# Patient Record
Sex: MALE | Race: WHITE | NOT HISPANIC OR LATINO | URBAN - METROPOLITAN AREA
[De-identification: names, ages, dates, MRNs, and addresses within clinical notes are randomized per-mention and may not be internally consistent; named-entity substitution may affect disease eponyms.]

---

## 2023-04-18 ENCOUNTER — LAB VISIT (OUTPATIENT)
Dept: LAB | Facility: HOSPITAL | Age: 23
End: 2023-04-18
Attending: FAMILY MEDICINE
Payer: COMMERCIAL

## 2023-04-18 ENCOUNTER — OFFICE VISIT (OUTPATIENT)
Dept: FAMILY MEDICINE | Facility: CLINIC | Age: 23
End: 2023-04-18
Payer: COMMERCIAL

## 2023-04-18 VITALS
HEIGHT: 70 IN | WEIGHT: 216.69 LBS | DIASTOLIC BLOOD PRESSURE: 60 MMHG | RESPIRATION RATE: 18 BRPM | SYSTOLIC BLOOD PRESSURE: 118 MMHG | TEMPERATURE: 98 F | OXYGEN SATURATION: 98 % | HEART RATE: 67 BPM | BODY MASS INDEX: 31.02 KG/M2

## 2023-04-18 DIAGNOSIS — Z13.220 ENCOUNTER FOR LIPID SCREENING FOR CARDIOVASCULAR DISEASE: ICD-10-CM

## 2023-04-18 DIAGNOSIS — E66.09 CLASS 1 OBESITY DUE TO EXCESS CALORIES WITH SERIOUS COMORBIDITY AND BODY MASS INDEX (BMI) OF 30.0 TO 30.9 IN ADULT: ICD-10-CM

## 2023-04-18 DIAGNOSIS — E66.9 OBESITY, UNSPECIFIED CLASSIFICATION, UNSPECIFIED OBESITY TYPE, UNSPECIFIED WHETHER SERIOUS COMORBIDITY PRESENT: ICD-10-CM

## 2023-04-18 DIAGNOSIS — Z01.00 ENCOUNTER FOR EYE EXAM: ICD-10-CM

## 2023-04-18 DIAGNOSIS — J31.0 CHRONIC RHINITIS: ICD-10-CM

## 2023-04-18 DIAGNOSIS — L65.9 ALOPECIA: ICD-10-CM

## 2023-04-18 DIAGNOSIS — Z11.4 ENCOUNTER FOR SCREENING FOR HIV: ICD-10-CM

## 2023-04-18 DIAGNOSIS — Z11.59 NEED FOR HEPATITIS C SCREENING TEST: ICD-10-CM

## 2023-04-18 DIAGNOSIS — Z23 NEED FOR VACCINATION: ICD-10-CM

## 2023-04-18 DIAGNOSIS — Z13.6 ENCOUNTER FOR LIPID SCREENING FOR CARDIOVASCULAR DISEASE: ICD-10-CM

## 2023-04-18 DIAGNOSIS — Z00.00 ROUTINE MEDICAL EXAM: Primary | ICD-10-CM

## 2023-04-18 DIAGNOSIS — Z00.00 ROUTINE MEDICAL EXAM: ICD-10-CM

## 2023-04-18 PROCEDURE — 84480 ASSAY TRIIODOTHYRONINE (T3): CPT | Performed by: FAMILY MEDICINE

## 2023-04-18 PROCEDURE — 84439 ASSAY OF FREE THYROXINE: CPT | Performed by: FAMILY MEDICINE

## 2023-04-18 PROCEDURE — 3044F HG A1C LEVEL LT 7.0%: CPT | Mod: CPTII,S$GLB,, | Performed by: FAMILY MEDICINE

## 2023-04-18 PROCEDURE — 87389 HIV-1 AG W/HIV-1&-2 AB AG IA: CPT | Performed by: FAMILY MEDICINE

## 2023-04-18 PROCEDURE — 99999 PR PBB SHADOW E&M-NEW PATIENT-LVL V: CPT | Mod: PBBFAC,,, | Performed by: FAMILY MEDICINE

## 2023-04-18 PROCEDURE — 3074F SYST BP LT 130 MM HG: CPT | Mod: CPTII,S$GLB,, | Performed by: FAMILY MEDICINE

## 2023-04-18 PROCEDURE — 3074F PR MOST RECENT SYSTOLIC BLOOD PRESSURE < 130 MM HG: ICD-10-PCS | Mod: CPTII,S$GLB,, | Performed by: FAMILY MEDICINE

## 2023-04-18 PROCEDURE — 86038 ANTINUCLEAR ANTIBODIES: CPT | Performed by: FAMILY MEDICINE

## 2023-04-18 PROCEDURE — 1160F PR REVIEW ALL MEDS BY PRESCRIBER/CLIN PHARMACIST DOCUMENTED: ICD-10-PCS | Mod: CPTII,S$GLB,, | Performed by: FAMILY MEDICINE

## 2023-04-18 PROCEDURE — 36415 COLL VENOUS BLD VENIPUNCTURE: CPT | Mod: PN | Performed by: FAMILY MEDICINE

## 2023-04-18 PROCEDURE — 3008F PR BODY MASS INDEX (BMI) DOCUMENTED: ICD-10-PCS | Mod: CPTII,S$GLB,, | Performed by: FAMILY MEDICINE

## 2023-04-18 PROCEDURE — 90715 TDAP VACCINE 7 YRS/> IM: CPT | Mod: S$GLB,,, | Performed by: FAMILY MEDICINE

## 2023-04-18 PROCEDURE — 3078F PR MOST RECENT DIASTOLIC BLOOD PRESSURE < 80 MM HG: ICD-10-PCS | Mod: CPTII,S$GLB,, | Performed by: FAMILY MEDICINE

## 2023-04-18 PROCEDURE — 85025 COMPLETE CBC W/AUTO DIFF WBC: CPT | Performed by: FAMILY MEDICINE

## 2023-04-18 PROCEDURE — 90471 IMMUNIZATION ADMIN: CPT | Mod: S$GLB,,, | Performed by: FAMILY MEDICINE

## 2023-04-18 PROCEDURE — 99999 PR PBB SHADOW E&M-NEW PATIENT-LVL V: ICD-10-PCS | Mod: PBBFAC,,, | Performed by: FAMILY MEDICINE

## 2023-04-18 PROCEDURE — 80053 COMPREHEN METABOLIC PANEL: CPT | Performed by: FAMILY MEDICINE

## 2023-04-18 PROCEDURE — 86803 HEPATITIS C AB TEST: CPT | Performed by: FAMILY MEDICINE

## 2023-04-18 PROCEDURE — 1159F MED LIST DOCD IN RCRD: CPT | Mod: CPTII,S$GLB,, | Performed by: FAMILY MEDICINE

## 2023-04-18 PROCEDURE — 3078F DIAST BP <80 MM HG: CPT | Mod: CPTII,S$GLB,, | Performed by: FAMILY MEDICINE

## 2023-04-18 PROCEDURE — 1160F RVW MEDS BY RX/DR IN RCRD: CPT | Mod: CPTII,S$GLB,, | Performed by: FAMILY MEDICINE

## 2023-04-18 PROCEDURE — 1159F PR MEDICATION LIST DOCUMENTED IN MEDICAL RECORD: ICD-10-PCS | Mod: CPTII,S$GLB,, | Performed by: FAMILY MEDICINE

## 2023-04-18 PROCEDURE — 99385 PR PREVENTIVE VISIT,NEW,18-39: ICD-10-PCS | Mod: 25,S$GLB,, | Performed by: FAMILY MEDICINE

## 2023-04-18 PROCEDURE — 3008F BODY MASS INDEX DOCD: CPT | Mod: CPTII,S$GLB,, | Performed by: FAMILY MEDICINE

## 2023-04-18 PROCEDURE — 90471 TDAP VACCINE GREATER THAN OR EQUAL TO 7YO IM: ICD-10-PCS | Mod: S$GLB,,, | Performed by: FAMILY MEDICINE

## 2023-04-18 PROCEDURE — 80061 LIPID PANEL: CPT | Performed by: FAMILY MEDICINE

## 2023-04-18 PROCEDURE — 84443 ASSAY THYROID STIM HORMONE: CPT | Performed by: FAMILY MEDICINE

## 2023-04-18 PROCEDURE — 90715 TDAP VACCINE GREATER THAN OR EQUAL TO 7YO IM: ICD-10-PCS | Mod: S$GLB,,, | Performed by: FAMILY MEDICINE

## 2023-04-18 PROCEDURE — 83036 HEMOGLOBIN GLYCOSYLATED A1C: CPT | Performed by: FAMILY MEDICINE

## 2023-04-18 PROCEDURE — 3044F PR MOST RECENT HEMOGLOBIN A1C LEVEL <7.0%: ICD-10-PCS | Mod: CPTII,S$GLB,, | Performed by: FAMILY MEDICINE

## 2023-04-18 PROCEDURE — 99385 PREV VISIT NEW AGE 18-39: CPT | Mod: 25,S$GLB,, | Performed by: FAMILY MEDICINE

## 2023-04-18 PROCEDURE — 84630 ASSAY OF ZINC: CPT | Performed by: FAMILY MEDICINE

## 2023-04-18 RX ORDER — FLUTICASONE PROPIONATE 50 MCG
2 SPRAY, SUSPENSION (ML) NASAL DAILY
Qty: 16 ML | Refills: 0 | Status: SHIPPED | OUTPATIENT
Start: 2023-04-18

## 2023-04-18 RX ORDER — LEVOCETIRIZINE DIHYDROCHLORIDE 5 MG/1
5 TABLET, FILM COATED ORAL NIGHTLY
Qty: 90 TABLET | Refills: 3 | Status: SHIPPED | OUTPATIENT
Start: 2023-04-18 | End: 2024-04-17

## 2023-04-18 NOTE — PROGRESS NOTES
Administered Tdap to left deltoid. Patient provided VIS 8/6/2021. Patient was instructed to wait in lobby for 15 minutes to note for reactions. Verbalized understanding.

## 2023-04-18 NOTE — PROGRESS NOTES
"  Patient Name: Will Lizarraga    : 2000  MRN: [id number]      Subjective:     Patient ID: Will is a 22 y.o. male    Chief Complaint:  Establish Care    22-year-old male comes in with his mother to establish primary care.  The patient has a history of alopecia, which first presented in childhood a and then worsened at the age of 13 years old, and again after COVID.  The he has seen dermatology for this and has previously received localized steroid injections.  He continues to lose air throughout his scalp in patches.      On questioning he also reports recurring nasal congestion and allergies.         Review of Systems   Constitutional:  Negative for unexpected weight change.   HENT:  Positive for postnasal drip and sinus pressure/congestion. Negative for ear pain and sore throat.    Eyes:  Negative for visual disturbance.   Respiratory:  Negative for shortness of breath.    Cardiovascular:  Negative for chest pain.   Gastrointestinal:  Negative for abdominal pain and blood in stool.   Genitourinary:  Negative for dysuria and frequency.   Integumentary:  Negative for rash.        Patches of hair loss on scalp and has lost hair on a bilateral lower extremities, and upper extremities, and torso   Neurological:  Negative for weakness, numbness and headaches.   Hematological:  Negative for adenopathy.   Psychiatric/Behavioral:  Negative for suicidal ideas.       Objective:   /60 (BP Location: Right arm, Patient Position: Sitting, BP Method: X-Large (Manual))   Pulse 67   Temp 98.3 °F (36.8 °C) (Oral)   Resp 18   Ht 5' 10.24" (1.784 m)   Wt 98.3 kg (216 lb 11.4 oz)   SpO2 98%   BMI 30.89 kg/m²     Physical Exam  Vitals reviewed.   Constitutional:       General: He is not in acute distress.     Appearance: He is obese.   HENT:      Head: Normocephalic and atraumatic.      Right Ear: Ear canal and external ear normal.      Left Ear: Ear canal and external ear normal.      Nose: Septal deviation and " congestion present.      Right Turbinates: Swollen.      Left Turbinates: Swollen.      Mouth/Throat:      Mouth: Mucous membranes are moist.      Pharynx: No oropharyngeal exudate or posterior oropharyngeal erythema.   Eyes:      Extraocular Movements: Extraocular movements intact.      Conjunctiva/sclera: Conjunctivae normal.      Pupils: Pupils are equal, round, and reactive to light.   Cardiovascular:      Rate and Rhythm: Normal rate and regular rhythm.      Pulses: Normal pulses.      Heart sounds: Normal heart sounds.   Pulmonary:      Effort: Pulmonary effort is normal. No respiratory distress.      Breath sounds: No wheezing or rales.   Abdominal:      General: Abdomen is flat. Bowel sounds are normal. There is no distension.      Palpations: Abdomen is soft.      Tenderness: There is no abdominal tenderness. There is no guarding.   Musculoskeletal:      Cervical back: Normal range of motion. No rigidity or tenderness.   Lymphadenopathy:      Cervical: No cervical adenopathy.   Skin:     General: Skin is warm.      Capillary Refill: Capillary refill takes less than 2 seconds.      Comments: Patches of scalp hair loss, absence of hair on torso and extremities.  Does have axillary hair   Neurological:      Mental Status: He is alert and oriented to person, place, and time.      Cranial Nerves: No cranial nerve deficit.      Sensory: No sensory deficit.   Psychiatric:         Mood and Affect: Mood normal.         Behavior: Behavior normal.      Assessment        ICD-10-CM ICD-9-CM   1. Routine medical exam  Z00.00 V70.0   2. Encounter for lipid screening for cardiovascular disease  Z13.220 V77.91    Z13.6 V81.2   3. Encounter for screening for HIV  Z11.4 V73.89   4. Need for hepatitis C screening test  Z11.59 V73.89   5. Alopecia  L65.9 704.00   6. Obesity, unspecified classification, unspecified obesity type, unspecified whether serious comorbidity present  E66.9 278.00   7. Need for vaccination  Z23 V05.9    8. Chronic rhinitis  J31.0 472.0   9. Encounter for eye exam  Z01.00 V72.0         Plan:     1. Routine medical exam  Assessment & Plan:  Age appropriate recommendations reviewed.  Screening labs ordered.     Orders:   -     Comprehensive Metabolic Panel; Future; Expected date: 04/18/2023  -     CBC Auto Differential; Future; Expected date: 04/18/2023  -     Lipid Panel; Future; Expected date: 04/18/2023  -     HIV 1/2 Ag/Ab (4th Gen); Future; Expected date: 04/18/2023  -     Hepatitis C Antibody; Future; Expected date: 04/18/2023  -     TSH; Future; Expected date: 04/18/2023  -     T4, Free; Future; Expected date: 04/18/2023  -     T3; Future; Expected date: 04/18/2023  -     ZINC; Future; Expected date: 04/18/2023  -     Hemoglobin A1C; Future; Expected date: 04/18/2023    2. Encounter for lipid screening for cardiovascular disease  Assessment & Plan:  Screening lipid panel ordered.     Orders:   -     Lipid Panel; Future; Expected date: 04/18/2023    3. Encounter for screening for HIV  Assessment & Plan:  Screen for HIV as per USPSTF guidelines     Orders:   -     HIV 1/2 Ag/Ab (4th Gen); Future; Expected date: 04/18/2023    4. Need for hepatitis C screening test  Assessment & Plan:  Screen for HCV as per USPSTF guidelines     Orders:   -     Hepatitis C Antibody; Future; Expected date: 04/18/2023    5. Alopecia  Assessment & Plan:  Check labs.  The patient's mother requested that in addition to TSH, to to check T3 and T4-order placed.  Gave information for hair specialist    Orders:   -     CBC Auto Differential; Future; Expected date: 04/18/2023  -     TSH; Future; Expected date: 04/18/2023  -     T4, Free; Future; Expected date: 04/18/2023  -     T3; Future; Expected date: 04/18/2023  -     BEE Screen w/Reflex; Future; Expected date: 04/18/2023    6. Class one obesity due to excess calories with serous comorbidity and body mass index (BMI) of 30.0 to 30.9 in adult  Assessment & Plan:  Screen for diabetes  based on obesity    Orders:   -     Hemoglobin A1C; Future; Expected date: 04/18/2023    7. Need for vaccination  -     (In Office Administered) Tdap Vaccine    8. Chronic rhinitis  Assessment & Plan:  Course of Flonase recommended.  Patient shown how to use via video.    Orders:   -     levocetirizine (XYZAL) 5 MG tablet; Take 1 tablet (5 mg total) by mouth every evening.  Dispense: 90 tablet; Refill: 3  -     fluticasone propionate (FLONASE) 50 mcg/actuation nasal spray; 2 sprays (100 mcg total) by Each Nostril route once daily.  Dispense: 16 mL; Refill: 0    9. Encounter for eye exam  -     Ambulatory referral/consult to Optometry; Future; Expected date: 04/25/2023           -Anam Mcduffie Jr., MD, AAHIVS      This office note has been dictated.  This dictation has been generated using M-Modal Fluency Direct dictation; some phonetic errors may occur.         Patient Instructions   Dr Turner- Hair specialist  [email or web address]    Please call [phone] to schedule your eye care appointment    how to use nasal spray(s) properly via video (https://www.youtube.com/watch?v=[email or web address])          Future Appointments   Date Time Provider Department Center   8/23/2023 10:45 AM PETE[facility]

## 2023-04-18 NOTE — PATIENT INSTRUCTIONS
Dr Turner- Hair specialist  [email or web address]    Please call [phone] to schedule your eye care appointment    how to use nasal spray(s) properly via video ([email or web address].com/[email or web address]?v=[email or web address])

## 2023-04-19 DIAGNOSIS — R29.818 SUSPECTED SLEEP APNEA: Primary | ICD-10-CM

## 2023-04-19 LAB
ALBUMIN SERPL BCP-MCNC: 4.6 G/DL (ref 3.5–5.2)
ALP SERPL-CCNC: 39 U/L (ref 55–135)
ALT SERPL W/O P-5'-P-CCNC: 30 U/L (ref 10–44)
ANION GAP SERPL CALC-SCNC: 11 MMOL/L (ref 8–16)
AST SERPL-CCNC: 20 U/L (ref 10–40)
BASOPHILS # BLD AUTO: 0.06 K/UL (ref 0–0.2)
BASOPHILS NFR BLD: 0.6 % (ref 0–1.9)
BILIRUB SERPL-MCNC: 0.7 MG/DL (ref 0.1–1)
BUN SERPL-MCNC: 14 MG/DL (ref 6–20)
CALCIUM SERPL-MCNC: 9.8 MG/DL (ref 8.7–10.5)
CHLORIDE SERPL-SCNC: 104 MMOL/L (ref 95–110)
CHOLEST SERPL-MCNC: 187 MG/DL (ref 120–199)
CHOLEST/HDLC SERPL: 4.7 {RATIO} (ref 2–5)
CO2 SERPL-SCNC: 26 MMOL/L (ref 23–29)
CREAT SERPL-MCNC: 1.1 MG/DL (ref 0.5–1.4)
DIFFERENTIAL METHOD: NORMAL
EOSINOPHIL # BLD AUTO: 0.2 K/UL (ref 0–0.5)
EOSINOPHIL NFR BLD: 2.1 % (ref 0–8)
ERYTHROCYTE [DISTWIDTH] IN BLOOD BY AUTOMATED COUNT: 12.1 % (ref 11.5–14.5)
EST. GFR  (NO RACE VARIABLE): >60 ML/MIN/1.73 M^2
ESTIMATED AVG GLUCOSE: 91 MG/DL (ref 68–131)
GLUCOSE SERPL-MCNC: 96 MG/DL (ref 70–110)
HBA1C MFR BLD: 4.8 % (ref 4–5.6)
HCT VFR BLD AUTO: 44.3 % (ref 40–54)
HCV AB SERPL QL IA: NORMAL
HDLC SERPL-MCNC: 40 MG/DL (ref 40–75)
HDLC SERPL: 21.4 % (ref 20–50)
HGB BLD-MCNC: 14.6 G/DL (ref 14–18)
HIV 1+2 AB+HIV1 P24 AG SERPL QL IA: NORMAL
IMM GRANULOCYTES # BLD AUTO: 0.03 K/UL (ref 0–0.04)
IMM GRANULOCYTES NFR BLD AUTO: 0.3 % (ref 0–0.5)
LDLC SERPL CALC-MCNC: 106.6 MG/DL (ref 63–159)
LYMPHOCYTES # BLD AUTO: 2.6 K/UL (ref 1–4.8)
LYMPHOCYTES NFR BLD: 26.8 % (ref 18–48)
MCH RBC QN AUTO: 29.3 PG (ref 27–31)
MCHC RBC AUTO-ENTMCNC: 33 G/DL (ref 32–36)
MCV RBC AUTO: 89 FL (ref 82–98)
MONOCYTES # BLD AUTO: 0.9 K/UL (ref 0.3–1)
MONOCYTES NFR BLD: 8.9 % (ref 4–15)
NEUTROPHILS # BLD AUTO: 5.9 K/UL (ref 1.8–7.7)
NEUTROPHILS NFR BLD: 61.3 % (ref 38–73)
NONHDLC SERPL-MCNC: 147 MG/DL
NRBC BLD-RTO: 0 /100 WBC
PLATELET # BLD AUTO: 278 K/UL (ref 150–450)
PMV BLD AUTO: 11.2 FL (ref 9.2–12.9)
POTASSIUM SERPL-SCNC: 4.2 MMOL/L (ref 3.5–5.1)
PROT SERPL-MCNC: 8 G/DL (ref 6–8.4)
RBC # BLD AUTO: 4.99 M/UL (ref 4.6–6.2)
SODIUM SERPL-SCNC: 141 MMOL/L (ref 136–145)
T3 SERPL-MCNC: 117 NG/DL (ref 60–180)
T4 FREE SERPL-MCNC: 1 NG/DL (ref 0.71–1.51)
TRIGL SERPL-MCNC: 202 MG/DL (ref 30–150)
TSH SERPL DL<=0.005 MIU/L-ACNC: 1.5 UIU/ML (ref 0.4–4)
WBC # BLD AUTO: 9.57 K/UL (ref 3.9–12.7)

## 2023-04-20 LAB — ANA SER QL IF: NORMAL

## 2023-04-24 LAB — ZINC SERPL-MCNC: 106 UG/DL (ref 60–130)

## 2024-10-22 ENCOUNTER — TELEPHONE (OUTPATIENT)
Dept: FAMILY MEDICINE | Facility: CLINIC | Age: 24
End: 2024-10-22
Payer: COMMERCIAL

## 2024-10-22 NOTE — TELEPHONE ENCOUNTER
Camposm for pt to contact office to  schedule an appointment for a referral to ortho.  ----- Message from Lauri sent at 10/21/2024 10:12 AM CDT -----  Regarding: Mom Katlyn  Type: Patient requesting referral     Who Called:Mom     Referral to What Specialty: orthopedics    Reason for Referral: pain Left hip     Does the patient want the referral with a specific physician?: lois wilson     [facility]     Would the patient rather a call back or a response via [facility]? Call back     Best Call Back Number: .[phone]      Additional Information:

## 2024-11-15 ENCOUNTER — LAB VISIT (OUTPATIENT)
Dept: LAB | Facility: HOSPITAL | Age: 24
End: 2024-11-15
Attending: PHYSICIAN ASSISTANT
Payer: COMMERCIAL

## 2024-11-15 DIAGNOSIS — Z79.899 DRUG THERAPY: Primary | ICD-10-CM

## 2024-11-15 DIAGNOSIS — Z92.25 S/P IMMUNOSUPPRESSIVE THERAPY: ICD-10-CM

## 2024-11-15 DIAGNOSIS — L63.9 ALOPECIA AREATA: ICD-10-CM

## 2024-11-15 LAB
ALBUMIN SERPL BCP-MCNC: 4.6 G/DL (ref 3.5–5.2)
ALP SERPL-CCNC: 41 U/L (ref 40–150)
ALT SERPL W/O P-5'-P-CCNC: 26 U/L (ref 10–44)
ANION GAP SERPL CALC-SCNC: 8 MMOL/L (ref 8–16)
AST SERPL-CCNC: 21 U/L (ref 10–40)
BILIRUB SERPL-MCNC: 1.1 MG/DL (ref 0.1–1)
BUN SERPL-MCNC: 12 MG/DL (ref 6–20)
CALCIUM SERPL-MCNC: 9.8 MG/DL (ref 8.7–10.5)
CHLORIDE SERPL-SCNC: 105 MMOL/L (ref 95–110)
CO2 SERPL-SCNC: 28 MMOL/L (ref 23–29)
CREAT SERPL-MCNC: 0.9 MG/DL (ref 0.5–1.4)
ERYTHROCYTE [DISTWIDTH] IN BLOOD BY AUTOMATED COUNT: 12.2 % (ref 11.5–14.5)
EST. GFR  (NO RACE VARIABLE): >60 ML/MIN/1.73 M^2
GLUCOSE SERPL-MCNC: 88 MG/DL (ref 70–110)
HAV IGM SERPL QL IA: ABNORMAL
HBV CORE IGM SERPL QL IA: ABNORMAL
HBV SURFACE AG SERPL QL IA: ABNORMAL
HCT VFR BLD AUTO: 46.9 % (ref 40–54)
HCV AB SERPL QL IA: REACTIVE
HGB BLD-MCNC: 15.6 G/DL (ref 14–18)
MCH RBC QN AUTO: 30.3 PG (ref 27–31)
MCHC RBC AUTO-ENTMCNC: 33.3 G/DL (ref 32–36)
MCV RBC AUTO: 91 FL (ref 82–98)
PLATELET # BLD AUTO: 292 K/UL (ref 150–450)
PMV BLD AUTO: 9.8 FL (ref 9.2–12.9)
POTASSIUM SERPL-SCNC: 4.4 MMOL/L (ref 3.5–5.1)
PROT SERPL-MCNC: 8.1 G/DL (ref 6–8.4)
RBC # BLD AUTO: 5.15 M/UL (ref 4.6–6.2)
SODIUM SERPL-SCNC: 141 MMOL/L (ref 136–145)
WBC # BLD AUTO: 8.58 K/UL (ref 3.9–12.7)

## 2024-11-15 PROCEDURE — 36415 COLL VENOUS BLD VENIPUNCTURE: CPT | Performed by: PHYSICIAN ASSISTANT

## 2024-11-15 PROCEDURE — 86480 TB TEST CELL IMMUN MEASURE: CPT | Performed by: PHYSICIAN ASSISTANT

## 2024-11-15 PROCEDURE — 80053 COMPREHEN METABOLIC PANEL: CPT | Performed by: PHYSICIAN ASSISTANT

## 2024-11-15 PROCEDURE — 80074 ACUTE HEPATITIS PANEL: CPT | Performed by: PHYSICIAN ASSISTANT

## 2024-11-15 PROCEDURE — 85027 COMPLETE CBC AUTOMATED: CPT | Performed by: PHYSICIAN ASSISTANT

## 2024-11-16 LAB
GAMMA INTERFERON BACKGROUND BLD IA-ACNC: 0 IU/ML
M TB IFN-G CD4+ BCKGRND COR BLD-ACNC: 0.11 IU/ML
M TB IFN-G CD4+ BCKGRND COR BLD-ACNC: 0.14 IU/ML
MITOGEN IGNF BCKGRD COR BLD-ACNC: 10 IU/ML
TB GOLD PLUS: NEGATIVE

## 2024-11-18 ENCOUNTER — LAB VISIT (OUTPATIENT)
Dept: LAB | Facility: HOSPITAL | Age: 24
End: 2024-11-18
Attending: PHYSICIAN ASSISTANT
Payer: COMMERCIAL

## 2024-11-18 DIAGNOSIS — L63.9 ALOPECIA AREATA: Primary | ICD-10-CM

## 2024-11-18 DIAGNOSIS — Z79.899 DRUG THERAPY: ICD-10-CM

## 2024-11-18 DIAGNOSIS — Z92.25 PERSONAL HISTORY OF IMMUNOSUPPRESSION THERAPY: ICD-10-CM

## 2024-11-18 DIAGNOSIS — L63.9 ALOPECIA AREATA: ICD-10-CM

## 2024-11-18 LAB
ALBUMIN SERPL BCP-MCNC: 4.5 G/DL (ref 3.5–5.2)
ALP SERPL-CCNC: 39 U/L (ref 40–150)
ALT SERPL W/O P-5'-P-CCNC: 21 U/L (ref 10–44)
ANION GAP SERPL CALC-SCNC: 6 MMOL/L (ref 8–16)
AST SERPL-CCNC: 16 U/L (ref 10–40)
BILIRUB SERPL-MCNC: 0.6 MG/DL (ref 0.1–1)
BUN SERPL-MCNC: 14 MG/DL (ref 6–20)
CALCIUM SERPL-MCNC: 9.5 MG/DL (ref 8.7–10.5)
CHLORIDE SERPL-SCNC: 105 MMOL/L (ref 95–110)
CO2 SERPL-SCNC: 27 MMOL/L (ref 23–29)
CREAT SERPL-MCNC: 0.9 MG/DL (ref 0.5–1.4)
EST. GFR  (NO RACE VARIABLE): >60 ML/MIN/1.73 M^2
GLUCOSE SERPL-MCNC: 109 MG/DL (ref 70–110)
POTASSIUM SERPL-SCNC: 4 MMOL/L (ref 3.5–5.1)
PROT SERPL-MCNC: 7.7 G/DL (ref 6–8.4)
SODIUM SERPL-SCNC: 138 MMOL/L (ref 136–145)

## 2024-11-18 PROCEDURE — 36415 COLL VENOUS BLD VENIPUNCTURE: CPT | Performed by: PHYSICIAN ASSISTANT

## 2024-11-18 PROCEDURE — 80053 COMPREHEN METABOLIC PANEL: CPT | Performed by: PHYSICIAN ASSISTANT

## 2024-11-18 PROCEDURE — 80074 ACUTE HEPATITIS PANEL: CPT | Performed by: PHYSICIAN ASSISTANT

## 2024-11-19 ENCOUNTER — PATIENT MESSAGE (OUTPATIENT)
Dept: FAMILY MEDICINE | Facility: CLINIC | Age: 24
End: 2024-11-19
Payer: COMMERCIAL

## 2024-11-19 LAB
HAV IGM SERPL QL IA: ABNORMAL
HBV CORE IGM SERPL QL IA: ABNORMAL
HBV SURFACE AG SERPL QL IA: ABNORMAL
HCV AB SERPL QL IA: REACTIVE

## 2024-11-20 ENCOUNTER — OFFICE VISIT (OUTPATIENT)
Dept: FAMILY MEDICINE | Facility: CLINIC | Age: 24
End: 2024-11-20
Payer: COMMERCIAL

## 2024-11-20 ENCOUNTER — TELEPHONE (OUTPATIENT)
Dept: FAMILY MEDICINE | Facility: CLINIC | Age: 24
End: 2024-11-20
Payer: COMMERCIAL

## 2024-11-20 ENCOUNTER — LAB VISIT (OUTPATIENT)
Dept: LAB | Facility: HOSPITAL | Age: 24
End: 2024-11-20
Attending: FAMILY MEDICINE
Payer: COMMERCIAL

## 2024-11-20 VITALS
HEART RATE: 74 BPM | WEIGHT: 213.38 LBS | HEIGHT: 70 IN | DIASTOLIC BLOOD PRESSURE: 70 MMHG | BODY MASS INDEX: 30.55 KG/M2 | OXYGEN SATURATION: 98 % | SYSTOLIC BLOOD PRESSURE: 116 MMHG | RESPIRATION RATE: 19 BRPM

## 2024-11-20 DIAGNOSIS — R76.8 POSITIVE HEPATITIS C ANTIBODY TEST: Primary | ICD-10-CM

## 2024-11-20 DIAGNOSIS — E66.9 OBESITY (BMI 30-39.9): Chronic | ICD-10-CM

## 2024-11-20 DIAGNOSIS — L65.9 ALOPECIA: Chronic | ICD-10-CM

## 2024-11-20 DIAGNOSIS — R76.8 POSITIVE HEPATITIS C ANTIBODY TEST: ICD-10-CM

## 2024-11-20 PROCEDURE — 87522 HEPATITIS C REVRS TRNSCRPJ: CPT | Performed by: FAMILY MEDICINE

## 2024-11-20 PROCEDURE — 3074F SYST BP LT 130 MM HG: CPT | Mod: CPTII,S$GLB,, | Performed by: FAMILY MEDICINE

## 2024-11-20 PROCEDURE — 1159F MED LIST DOCD IN RCRD: CPT | Mod: CPTII,S$GLB,, | Performed by: FAMILY MEDICINE

## 2024-11-20 PROCEDURE — 99999 PR PBB SHADOW E&M-EST. PATIENT-LVL IV: CPT | Mod: PBBFAC,,, | Performed by: FAMILY MEDICINE

## 2024-11-20 PROCEDURE — 36415 COLL VENOUS BLD VENIPUNCTURE: CPT | Mod: PN | Performed by: FAMILY MEDICINE

## 2024-11-20 PROCEDURE — 99214 OFFICE O/P EST MOD 30 MIN: CPT | Mod: S$GLB,,, | Performed by: FAMILY MEDICINE

## 2024-11-20 PROCEDURE — 3078F DIAST BP <80 MM HG: CPT | Mod: CPTII,S$GLB,, | Performed by: FAMILY MEDICINE

## 2024-11-20 PROCEDURE — G2211 COMPLEX E/M VISIT ADD ON: HCPCS | Mod: S$GLB,,, | Performed by: FAMILY MEDICINE

## 2024-11-20 PROCEDURE — 86706 HEP B SURFACE ANTIBODY: CPT | Mod: 91 | Performed by: FAMILY MEDICINE

## 2024-11-20 PROCEDURE — 1160F RVW MEDS BY RX/DR IN RCRD: CPT | Mod: CPTII,S$GLB,, | Performed by: FAMILY MEDICINE

## 2024-11-20 PROCEDURE — 86790 VIRUS ANTIBODY NOS: CPT | Performed by: FAMILY MEDICINE

## 2024-11-20 PROCEDURE — 3008F BODY MASS INDEX DOCD: CPT | Mod: CPTII,S$GLB,, | Performed by: FAMILY MEDICINE

## 2024-11-20 PROCEDURE — 86038 ANTINUCLEAR ANTIBODIES: CPT | Performed by: FAMILY MEDICINE

## 2024-11-21 LAB
HAV IGG SER QL IA: REACTIVE
HBV SURFACE AB SER-ACNC: <3 MIU/ML
HBV SURFACE AB SER-ACNC: NORMAL M[IU]/ML

## 2024-11-22 LAB
ANA SER QL IF: NORMAL
HCV RNA SERPL QL NAA+PROBE: NOT DETECTED
HCV RNA SPEC NAA+PROBE-ACNC: NOT DETECTED IU/ML

## 2024-11-27 ENCOUNTER — OFFICE VISIT (OUTPATIENT)
Dept: FAMILY MEDICINE | Facility: CLINIC | Age: 24
End: 2024-11-27
Payer: COMMERCIAL

## 2024-11-27 VITALS
TEMPERATURE: 98 F | HEIGHT: 70 IN | WEIGHT: 215.19 LBS | SYSTOLIC BLOOD PRESSURE: 120 MMHG | HEART RATE: 76 BPM | RESPIRATION RATE: 18 BRPM | DIASTOLIC BLOOD PRESSURE: 70 MMHG | BODY MASS INDEX: 30.81 KG/M2 | OXYGEN SATURATION: 97 %

## 2024-11-27 DIAGNOSIS — R22.32 ARM MASS, LEFT: Primary | Chronic | ICD-10-CM

## 2024-11-27 DIAGNOSIS — Z23 NEED FOR VACCINATION: ICD-10-CM

## 2024-11-27 DIAGNOSIS — R76.8 POSITIVE HEPATITIS C ANTIBODY TEST: Chronic | ICD-10-CM

## 2024-11-27 PROCEDURE — 90739 HEPB VACC 2/4 DOSE ADULT IM: CPT | Mod: S$GLB,,, | Performed by: FAMILY MEDICINE

## 2024-11-27 PROCEDURE — 1159F MED LIST DOCD IN RCRD: CPT | Mod: CPTII,S$GLB,, | Performed by: FAMILY MEDICINE

## 2024-11-27 PROCEDURE — 99999 PR PBB SHADOW E&M-EST. PATIENT-LVL IV: CPT | Mod: PBBFAC,,, | Performed by: FAMILY MEDICINE

## 2024-11-27 PROCEDURE — 3078F DIAST BP <80 MM HG: CPT | Mod: CPTII,S$GLB,, | Performed by: FAMILY MEDICINE

## 2024-11-27 PROCEDURE — 1160F RVW MEDS BY RX/DR IN RCRD: CPT | Mod: CPTII,S$GLB,, | Performed by: FAMILY MEDICINE

## 2024-11-27 PROCEDURE — 3074F SYST BP LT 130 MM HG: CPT | Mod: CPTII,S$GLB,, | Performed by: FAMILY MEDICINE

## 2024-11-27 PROCEDURE — 3008F BODY MASS INDEX DOCD: CPT | Mod: CPTII,S$GLB,, | Performed by: FAMILY MEDICINE

## 2024-11-27 PROCEDURE — 99214 OFFICE O/P EST MOD 30 MIN: CPT | Mod: S$GLB,,, | Performed by: FAMILY MEDICINE

## 2024-11-27 PROCEDURE — G0010 ADMIN HEPATITIS B VACCINE: HCPCS | Mod: S$GLB,,, | Performed by: FAMILY MEDICINE

## 2024-11-30 PROBLEM — L65.9 ALOPECIA: Chronic | Status: ACTIVE | Noted: 2024-11-30

## 2024-11-30 PROBLEM — E66.9 OBESITY (BMI 30-39.9): Chronic | Status: ACTIVE | Noted: 2024-11-30

## 2024-12-01 PROBLEM — R22.32 ARM MASS, LEFT: Chronic | Status: ACTIVE | Noted: 2024-12-01

## 2024-12-01 PROBLEM — R76.8 POSITIVE HEPATITIS C ANTIBODY TEST: Chronic | Status: ACTIVE | Noted: 2024-12-01

## 2024-12-01 NOTE — PROGRESS NOTES
"  Patient Name: Will Lizarraga    : 2000  MRN: [id number]      Subjective:     Patient ID: Will is a 24 y.o. male    Chief Complaint:  Follow-up and Results (/)    History of Present Illness    Will presents today for follow-up on abnormal Hepatitis C screening test.    He reports an abnormal Hepatitis C screening test result performed by Dr. Turner as part of a workup for alopecia. He received the results on Saturday and expresses concern about the finding. He reports past cocaine use but denies recent intravenous drug use. He also denies having any new sexual partners in the past year.    ROS:  General: -fever, -chills, -fatigue, -weight gain, -weight loss, -change in weight  Eyes: -vision changes, -redness, -discharge  ENT: -ear pain, -nasal congestion, -sore throat  Cardiovascular: -chest pain, -palpitations, -lower extremity edema  Respiratory: -cough, -shortness of breath  Gastrointestinal: -abdominal pain, -nausea, -vomiting, -diarrhea, -constipation, -blood in stool  Genitourinary: -dysuria, -hematuria, -frequency  Musculoskeletal: -joint pain, -muscle pain  Skin: -rash, +lesion  Neurological: -headache, -dizziness, -numbness, -tingling  Psychiatric: -anxiety, -depression, -sleep difficulty         Objective:   /70 (BP Location: Left arm, Patient Position: Sitting)   Pulse 74   Resp 19   Ht 5' 10" (1.778 m)   Wt 96.8 kg (213 lb 6.5 oz)   SpO2 98%   BMI 30.62 kg/m²     Physical Exam  Vitals reviewed.   Constitutional:       General: He is not in acute distress.     Appearance: He is obese.   HENT:      Head: Normocephalic and atraumatic.      Right Ear: Ear canal and external ear normal.      Left Ear: Ear canal and external ear normal.      Nose: Nose normal.      Mouth/Throat:      Mouth: Mucous membranes are moist.      Pharynx: No oropharyngeal exudate or posterior oropharyngeal erythema.   Eyes:      Extraocular Movements: Extraocular movements intact.      Conjunctiva/sclera: " Conjunctivae normal.      Pupils: Pupils are equal, round, and reactive to light.   Cardiovascular:      Rate and Rhythm: Normal rate and regular rhythm.      Pulses: Normal pulses.      Heart sounds: Normal heart sounds.   Pulmonary:      Effort: Pulmonary effort is normal. No respiratory distress.      Breath sounds: No wheezing or rales.   Abdominal:      General: Abdomen is flat. Bowel sounds are normal. There is no distension.      Palpations: Abdomen is soft.      Tenderness: There is no abdominal tenderness. There is no guarding.   Musculoskeletal:      Cervical back: Normal range of motion. No rigidity or tenderness.   Lymphadenopathy:      Cervical: No cervical adenopathy.   Skin:     General: Skin is warm.      Capillary Refill: Capillary refill takes less than 2 seconds.   Neurological:      Mental Status: He is alert and oriented to person, place, and time.      Cranial Nerves: No cranial nerve deficit.      Sensory: No sensory deficit.   Psychiatric:         Mood and Affect: Mood normal.         Behavior: Behavior normal.        Assessment        ICD-10-CM ICD-9-CM   1. Positive hepatitis C antibody test  R76.8 795.79   2. Alopecia  L65.9 704.00   3. Obesity (BMI 30-39.9)  E66.9 278.00         Plan:   Assessment & Plan    IMPRESSION:  Evaluating for possible hepatitis C infection based on abnormal screening test result  Ordered confirmatory viral load test to determine if active infection is present  Considering possibility of false positive screening test due to other conditions like lupus or rheumatoid arthritis  Assessing risk factors for hepatitis C exposure  Reviewed normal liver enzyme results, indicating good liver function currently  Planning to order additional testing for liver fibrosis if hepatitis C infection is confirmed  Discussed different treatment options for hepatitis-C    VIRAL HEPATITIS (HEPATITIS C) ANTIBODY POSITIVE:  Explained hepatitis C as a virus affecting the liver that can  cause silent damage over time.  Discussed risk factors for hepatitis C transmission, including sexual contact and drug use involving blood exposure.  Clarified that hepatitis C is curable with current treatments.  Explained potential for false positive hepatitis C screening test results.  Also discussed that approximately 20% of people will cure hepatitis-C without treatment.  Reviewed that hepatitis C antibodies remain after cure, requiring viral load testing to confirm active infection.  Discussed that hepatitis C does not confer immunity, so reinfection is possible after cure.  Will to avoid sharing personal items that may have blood contact (e.g. nail clippers, toothbrushes).  Recommend using condoms during sexual activity until hepatitis C status is confirmed.  Advised on holding off from taking Litfulo (biologic medication for alopecia) temporarily due to potential to worsen hepatitis C if present.  Hepatitis C viral load test ordered.  BEE test ordered to check for autoimmune conditions.  Hepatitis B surface antibody test ordered to check immunity status.  Hepatitis A IgG antibody test ordered to check immunity status.    ALOPECIA:  Hold off from taking Litfulo for now    OBESITY:  The patient's BMI has been recorded in the chart. The patient has been provided educational materials regarding the benefits of attaining and maintaining a normal weight. We will continue to address and follow this issue during follow up visits.             1. Positive hepatitis C antibody test  -     HEPATITIS C RNA, QUANTITATIVE, PCR; Future; Expected date: 11/20/2024  -     Hepatitis A antibody, IgG; Future; Expected date: 11/20/2024  -     Hepatitis B Surface Ab, Qualitative; Future; Expected date: 11/20/2024  -     BEE Screen w/Reflex; Future; Expected date: 11/20/2024    2. Alopecia    3. Obesity (BMI 30-39.9)  Overview:  Wt Readings from Last 3 Encounters:   11/27/24 1321 97.6 kg (215 lb 2.7 oz)   11/20/24 0956 96.8 kg  "(213 lb 6.5 oz)   04/18/23 1407 98.3 kg (216 lb 11.4 oz)      Estimated body mass index is 30.62 kg/m² as calculated from the following:    Height as of this encounter: 5' 10" (1.778 m).    Weight as of this encounter: 96.8 kg (213 lb 6.5 oz).  Ideal body weight: 73 kg (160 lb 15 oz)                -Anam Mcduffie Jr., MD, AAHIVS      This note was generated with the assistance of ambient listening technology. Verbal consent was obtained by the patient and accompanying visitor(s) for the recording of patient appointment to facilitate this note. I attest to having reviewed and edited the generated note for accuracy, though some syntax or spelling errors may persist. Please contact the author of this note for any clarification.      There are no Patient Instructions on file for this visit.      No follow-ups on file.   Future Appointments   Date Time Provider Department Center   12/21/2024  9:20 AM LAB, [facility]   12/21/2024 10:15 AM [facility] ROOM 2 [facility]   1/2/2025  8:00 AM NURSE, [facility] -            "

## 2024-12-02 NOTE — PROGRESS NOTES
"  Patient Name: Will Lizarraga    : 2000  MRN: [id number]      Subjective:     Patient ID: Will is a 24 y.o. male    Chief Complaint:  Follow-up    History of Present Illness    Will presents today for follow-up regarding hepatitis C test results and vaccination.    He reports receiving hepatitis C test results. He discusses two possible scenarios: a false positive  such as due to an autoimmune condition, which may produce proteins mimicking those detected in the hepatitis C antibody test, or a past infection that self-resolved within six months, which occurs in approximately 20% of cases. He expresses interest in further testing to clarify his status and obtain documentation confirming he does not have active hepatitis C.    He reports that a mass in his left upper arm feels like it is getting bigger, noting an apparent increase in size since mentioning it during a visit approximately one year ago.    ROS:  General: -fever, -chills, -fatigue, -weight gain, -weight loss  Eyes: -vision changes, -redness, -discharge  ENT: -ear pain, -nasal congestion, -sore throat  Cardiovascular: -chest pain, -palpitations, -lower extremity edema  Respiratory: -cough, -shortness of breath  Gastrointestinal: -abdominal pain, -nausea, -vomiting, -diarrhea, -constipation, -blood in stool  Genitourinary: -dysuria, -hematuria, -frequency  Musculoskeletal: -joint pain, -muscle pain  Skin: -rash, -lesion  Neurological: -headache, -dizziness, -numbness, -tingling  Psychiatric: -anxiety, -depression, -sleep difficulty         Objective:   /70 (BP Location: Left arm, Patient Position: Sitting)   Pulse 76   Temp 98 °F (36.7 °C) (Oral)   Resp 18   Ht 5' 10" (1.778 m)   Wt 97.6 kg (215 lb 2.7 oz)   SpO2 97%   BMI 30.87 kg/m²     Physical Exam  Cardiovascular:      Rate and Rhythm: Normal rate.      Pulses: Normal pulses.   Pulmonary:      Effort: Pulmonary effort is normal. No respiratory distress.      Breath sounds: No " wheezing.   Abdominal:      General: Abdomen is flat. Bowel sounds are normal. There is no distension.      Palpations: Abdomen is soft.   Musculoskeletal:        Arms:    Neurological:      Mental Status: He is alert.        Lab Visit on 11/20/2024   Component Date Value Ref Range Status    HCV Quantitative Result 11/20/2024 Not Detected  <12 IU/mL Final    HCV, Qualitative 11/20/2024 Not Detected  Not Detected Final    Comment: This procedure utilizes a real-time polymerase chain reaction test  from [employer]. The amplification target is a conserved region   of the HCV genome. The lower limit of quantitation is <12 IU/mL   (<1.08 Log IU/mL)and the upper limit of quantitation is 30 million   IU/mL (7.48 Log IU/mL).     Specimens reported as Detected but <12 IU/mL contain detectable  levels of Hepatitis C RNA but the viral load is below the limit of   quantitation. A Not Detected result does not rule out HCV infection,   clinical correlation is recommended.      Hepatitis A Antibody IgG 11/20/2024 Reactive   Final    Comment: IgG anti-HAV detected. The presence of IgG anti-HAV  implies past infection (recent or distant) or   vaccination against HAV. Detectable levels above  the assay cutoff suggest immunity to HAV infection.      Hep B S Ab 11/20/2024 <3.00  mIU/mL Final    Hep B S Ab 11/20/2024 Non-reactive   Final    Individual is considered not immune to HBV infection.    BEE Screen 11/20/2024 Negative <1:80  Negative <1:80 Final    BEE test was performed by Immunofluorescence on HEP2 cells.      Lab Visit on 11/18/2024   Component Date Value Ref Range Status    Sodium 11/18/2024 138  136 - 145 mmol/L Final    Potassium 11/18/2024 4.0  3.5 - 5.1 mmol/L Final    Chloride 11/18/2024 105  95 - 110 mmol/L Final    CO2 11/18/2024 27  23 - 29 mmol/L Final    Glucose 11/18/2024 109  70 - 110 mg/dL Final    BUN 11/18/2024 14  6 - 20 mg/dL Final    Creatinine 11/18/2024 0.9  0.5 - 1.4 mg/dL Final    Calcium  11/18/2024 9.5  8.7 - 10.5 mg/dL Final    Total Protein 11/18/2024 7.7  6.0 - 8.4 g/dL Final    Albumin 11/18/2024 4.5  3.5 - 5.2 g/dL Final    Total Bilirubin 11/18/2024 0.6  0.1 - 1.0 mg/dL Final    Comment: For infants and newborns, interpretation of results should be based  on gestational age, weight and in agreement with clinical  observations.    Premature Infant recommended reference ranges:  Up to 24 hours.............<8.0 mg/dL  Up to 48 hours............<12.0 mg/dL  3-5 days..................<15.0 mg/dL  6-29 days.................<15.0 mg/dL      Alkaline Phosphatase 11/18/2024 39 (L)  40 - 150 U/L Final    AST 11/18/2024 16  10 - 40 U/L Final    ALT 11/18/2024 21  10 - 44 U/L Final    eGFR 11/18/2024 >60  >60 mL/min/1.73 m^2 Final    Anion Gap 11/18/2024 6 (L)  8 - 16 mmol/L Final    Hepatitis B Surface Ag 11/18/2024 Non-reactive  Non-reactive Final    Hep B C IgM 11/18/2024 Non-reactive  Non-reactive Final    Hep A IgM 11/18/2024 Non-reactive  Non-reactive Final    Hepatitis C Ab 11/18/2024 Reactive (A)  Non-reactive Final    Comment: Presumptive evidence of antibodies to HCV; recommend   supplemental testing HCV RNA Quantitative by PCR   ([id number]) if clinically indicated.     Lab Visit on 11/15/2024   Component Date Value Ref Range Status    WBC 11/15/2024 8.58  3.90 - 12.70 K/uL Final    RBC 11/15/2024 5.15  4.60 - 6.20 M/uL Final    Hemoglobin 11/15/2024 15.6  14.0 - 18.0 g/dL Final    Hematocrit 11/15/2024 46.9  40.0 - 54.0 % Final    MCV 11/15/2024 91  82 - 98 fL Final    MCH 11/15/2024 30.3  27.0 - 31.0 pg Final    MCHC 11/15/2024 33.3  32.0 - 36.0 g/dL Final    RDW 11/15/2024 12.2  11.5 - 14.5 % Final    Platelets 11/15/2024 292  150 - 450 K/uL Final    MPV 11/15/2024 9.8  9.2 - 12.9 fL Final    Sodium 11/15/2024 141  136 - 145 mmol/L Final    Potassium 11/15/2024 4.4  3.5 - 5.1 mmol/L Final    Chloride 11/15/2024 105  95 - 110 mmol/L Final    CO2 11/15/2024 28  23 - 29 mmol/L Final     Glucose 11/15/2024 88  70 - 110 mg/dL Final    BUN 11/15/2024 12  6 - 20 mg/dL Final    Creatinine 11/15/2024 0.9  0.5 - 1.4 mg/dL Final    Calcium 11/15/2024 9.8  8.7 - 10.5 mg/dL Final    Total Protein 11/15/2024 8.1  6.0 - 8.4 g/dL Final    Albumin 11/15/2024 4.6  3.5 - 5.2 g/dL Final    Total Bilirubin 11/15/2024 1.1 (H)  0.1 - 1.0 mg/dL Final    Comment: For infants and newborns, interpretation of results should be based  on gestational age, weight and in agreement with clinical  observations.    Premature Infant recommended reference ranges:  Up to 24 hours.............<8.0 mg/dL  Up to 48 hours............<12.0 mg/dL  3-5 days..................<15.0 mg/dL  6-29 days.................<15.0 mg/dL      Alkaline Phosphatase 11/15/2024 41  40 - 150 U/L Final    AST 11/15/2024 21  10 - 40 U/L Final    ALT 11/15/2024 26  10 - 44 U/L Final    eGFR 11/15/2024 >60  >60 mL/min/1.73 m^2 Final    Anion Gap 11/15/2024 8  8 - 16 mmol/L Final    NIL 11/15/2024 0.18173  IU/mL Final    Comment: The Nil tube value is used to determine if the patient   has a preexisting immune response which could cause a   false-positive reading on the test.   For a test to be valid, the NIL tube must have a value   of less than or equal to 8.0 IU/mL.  The mitogen control tube is used to assure the patient   has a healthy immune status and also serves as a control   for correct blood handling and incubation. It is used to   detect false negative readings.   The TB antigen tubes are coated with the M. tuberculosis   specific antigens. For a test to be considered positive,   at least one of the TB antigen tube values minus the Nil   tube value must be greater than or equal to 0.35 IU/mL   and be greater than or equal to 25% of the Nil tube value.  Diagnosing or excluding tuberculosis disease, and assessing   the probability of LTNI, requires a combination of   epidemiological, historical, medical, and diagnostic   findings that should be  considered when interpreting   the test results.       TB1 - Nil 11/15/2024 0.143  IU/mL Final    TB2 - Nil 11/15/2024 0.113  IU/mL Final    Mitogen - Nil 11/15/2024 9.996  IU/mL Final    TB Gold Plus 11/15/2024 Negative  Negative Final    M. tuberculosis infection NOT likely.    Hepatitis B Surface Ag 11/15/2024 Non-reactive  Non-reactive Final    Hep B C IgM 11/15/2024 Non-reactive  Non-reactive Final    Hep A IgM 11/15/2024 Non-reactive  Non-reactive Final    Hepatitis C Ab 11/15/2024 Reactive (A)  Non-reactive Final    Comment: Presumptive evidence of antibodies to HCV; recommend   supplemental testing HCV RNA Quantitative by PCR   ([id number]) if clinically indicated.         Assessment        ICD-10-CM ICD-9-CM   1. Arm mass, left  R22.32 782.2   2. Positive hepatitis C antibody test  R76.8 795.79   3. Need for vaccination  Z23 V05.9         Plan:   Assessment & Plan    IMPRESSION:  Assessed patient's hepatitis C status: Recent viral load test negative  Considered 2 scenarios for previous positive antibody test: false positive or spontaneous clearance within past year  Evaluated hepatitis A and B immunity status: Patient immune to hepatitis A, not immune to hepatitis B  Noted growing MASS  on patient's left arm  Opted for ultrasound evaluate mass    POSITIVE HCV ANTIBODY:  Explained difference between hepatitis C antibody test and viral load test.  Discussed possibility of false positive hepatitis C antibody test in patients with autoimmune conditions.  Informed about spontaneous clearance of hepatitis C in approximately 20% of cases within 6 months.  Educated on hepatitis A and B immunity and vaccination history.  Ordered repeat hepatitis C viral load test.  Follow up in 1-3 weeks for repeat hepatitis C viral load test.    ARM MASS:  Provided information on lipomas and the rare possibility of liposarcomas.  Ordered ultrasound of growing mass evaluation.    IMMUNIZATION:  Started hepatitis B vaccine  (Heplosab): Administered first dose of two-dose series.  Administered hepatitis B vaccine (first dose).  Follow up in 5-6 weeks for second dose of hepatitis B vaccine.           1. Arm mass, left  -     US Extremity Non Vascular Limited Left; Future; Expected date: 11/27/2024    2. Positive hepatitis C antibody test  Overview:  Lab Results   Component Value Date    HCVQUALITATI Not Detected 11/20/2024       Orders:  -     Hepatitis C Antibody; Future; Expected date: 12/04/2024  -     HEPATITIS C RNA, QUANTITATIVE, PCR; Future; Expected date: 12/04/2024    3. Need for vaccination  -     hepatitis B (HEPLISAV-B) 20 mcg/0.5 mL vaccine 0.5 mL             -Anam Mcduffie Jr., MD, AAHIVS      This note was generated with the assistance of ambient listening technology. Verbal consent was obtained by the patient and accompanying visitor(s) for the recording of patient appointment to facilitate this note. I attest to having reviewed and edited the generated note for accuracy, though some syntax or spelling errors may persist. Please contact the author of this note for any clarification.      There are no Patient Instructions on file for this visit.      No follow-ups on file.   Future Appointments   Date Time Provider Department Center   12/21/2024  9:20 AM LAB, [facility]   12/21/2024 10:15 AM [facility] ROOM 2 [facility]   1/2/2025  8:00 AM NURSE, [facility]

## 2024-12-19 ENCOUNTER — TELEPHONE (OUTPATIENT)
Dept: FAMILY MEDICINE | Facility: CLINIC | Age: 24
End: 2024-12-19
Payer: COMMERCIAL

## 2024-12-19 NOTE — TELEPHONE ENCOUNTER
----- Message from Med Assistant Finley sent at 12/19/2024  9:01 AM CST -----  Regarding: FW: dr chapman    ----- Message -----  From: Alfredo Jimenez  Sent: 12/19/2024   8:56 AM CST  To: [facility]ieat Staff  Subject: dr chapman                                       Type: Patient Call Back    Who called:Dr chapman    What is the request in detail:calling to get some records on the pt about his prior authorization, progress notes     Can the clinic reply by [facility]?    Would the patient rather a call back or a response via [facility]? callback    Best call back number:[phone]  Ext [phone]    Additional Information:fax [phone]

## 2024-12-19 NOTE — TELEPHONE ENCOUNTER
Ellen with [facility] [employer] [facility] is requesting labs and notes verifying that Patient is not Hep C positive, as he has stated to Dr. Em.  Clinicals faxed to [phone].

## 2025-01-02 ENCOUNTER — CLINICAL SUPPORT (OUTPATIENT)
Dept: FAMILY MEDICINE | Facility: CLINIC | Age: 25
End: 2025-01-02
Payer: COMMERCIAL

## 2025-01-02 DIAGNOSIS — Z23 NEED FOR VACCINATION: Primary | ICD-10-CM

## 2025-01-02 PROCEDURE — 99999 PR PBB SHADOW E&M-EST. PATIENT-LVL I: CPT | Mod: PBBFAC,,,

## 2025-01-07 ENCOUNTER — TELEPHONE (OUTPATIENT)
Dept: FAMILY MEDICINE | Facility: CLINIC | Age: 25
End: 2025-01-07
Payer: COMMERCIAL

## 2025-01-11 ENCOUNTER — LAB VISIT (OUTPATIENT)
Dept: LAB | Facility: HOSPITAL | Age: 25
End: 2025-01-11
Attending: FAMILY MEDICINE
Payer: COMMERCIAL

## 2025-01-11 DIAGNOSIS — R76.8 POSITIVE HEPATITIS C ANTIBODY TEST: Chronic | ICD-10-CM

## 2025-01-11 LAB — HCV AB SERPL QL IA: REACTIVE

## 2025-01-11 PROCEDURE — 36415 COLL VENOUS BLD VENIPUNCTURE: CPT | Performed by: FAMILY MEDICINE

## 2025-01-11 PROCEDURE — 86803 HEPATITIS C AB TEST: CPT | Performed by: FAMILY MEDICINE

## 2025-01-11 PROCEDURE — 87522 HEPATITIS C REVRS TRNSCRPJ: CPT | Performed by: FAMILY MEDICINE

## 2025-01-14 LAB
HCV RNA SERPL QL NAA+PROBE: NOT DETECTED
HCV RNA SPEC NAA+PROBE-ACNC: NOT DETECTED IU/ML

## 2025-01-16 ENCOUNTER — TELEPHONE (OUTPATIENT)
Dept: FAMILY MEDICINE | Facility: CLINIC | Age: 25
End: 2025-01-16
Payer: COMMERCIAL